# Patient Record
Sex: MALE | Race: WHITE | Employment: FULL TIME | URBAN - METROPOLITAN AREA
[De-identification: names, ages, dates, MRNs, and addresses within clinical notes are randomized per-mention and may not be internally consistent; named-entity substitution may affect disease eponyms.]

---

## 2017-08-30 DIAGNOSIS — Z13.71 TESTING OF MALE FOR GENETIC DISEASE CARRIER STATUS: ICD-10-CM

## 2017-08-30 DIAGNOSIS — Z13.71 TESTING OF MALE FOR GENETIC DISEASE CARRIER STATUS: Primary | ICD-10-CM

## 2017-08-30 PROCEDURE — 36415 COLL VENOUS BLD VENIPUNCTURE: CPT | Performed by: DERMATOLOGY

## 2017-08-30 PROCEDURE — 40000795 ZZHCL STATISTIC DNA PROCESS AND HOLD: Performed by: DERMATOLOGY

## 2017-09-15 LAB — COPATH REPORT: NORMAL

## 2017-10-16 ENCOUNTER — ALLIED HEALTH/NURSE VISIT (OUTPATIENT)
Dept: NURSING | Facility: CLINIC | Age: 32
End: 2017-10-16
Payer: COMMERCIAL

## 2017-10-16 DIAGNOSIS — Z23 INFLUENZA VACCINE NEEDED: Primary | ICD-10-CM

## 2017-10-16 PROCEDURE — G0008 ADMIN INFLUENZA VIRUS VAC: HCPCS | Mod: ZF

## 2017-10-16 PROCEDURE — 90686 IIV4 VACC NO PRSV 0.5 ML IM: CPT | Mod: ZF

## 2017-10-16 PROCEDURE — 25000128 H RX IP 250 OP 636: Mod: ZF

## 2017-10-16 NOTE — NURSING NOTE
"Injectable Influenza Immunization Documentation    1.  Has the patient received the information for the injectable influenza vaccine? YES     2. Is the patient 6 months of age or older? YES     3. Does the patient have any of the following contraindications?         Severe allergy to eggs? No     Severe allergic reaction to previous influenza vaccines? No   Severe allergy to latex? No       History of Guillain-Dryden syndrome? No     Currently have a temperature greater than 100.4F? No        4.  Severely egg allergic patients should have flu vaccine eligibility assessed by an MD, RN, or pharmacist, and those who received flu vaccine should be observed for 15 min by an MD, RN, Pharmacist, Medical Technician, or member of clinic staff.\": YES    5. Latex-allergic patients should be given latex-free influenza vaccine Yes. Please reference the Vaccine latex table to determine if your clinic s product is latex-containing.       Vaccination given by Lisa Davis comes into clinic today at the request of Influenza Vaccine Ordering Provider for [facility].      This service provided today was under the supervising provider of the yessi Murcia, who was available if needed.    Reason for visit: Med Injection only Influenza Vaccine    Lisa Villalobos            "

## 2017-10-16 NOTE — MR AVS SNAPSHOT
After Visit Summary   10/16/2017    Sumit Davis    MRN: [id number]           Patient Information     Date Of Birth          1985        Visit Information        Provider Department      10/16/2017 3:45 PM 2512, [facility]        Today's Diagnoses     Influenza vaccine needed    -  1       Follow-ups after your visit        Who to contact     Please call your clinic at [phone] to:    Ask questions about your health    Make or cancel appointments    Discuss your medicines    Learn about your test results    Speak to your doctor   If you have compliments or concerns about an experience at your clinic, or if you wish to file a complaint, please contact [facility] Patient Relations at [phone] or email us at [email or web address]         Additional Information About Your Visit        MyChart Information     Desmos is an electronic gateway that provides easy, online access to your medical records. With Desmos, you can request a clinic appointment, read your test results, renew a prescription or communicate with your care team.     To sign up for Aviaryt visit the website at [email or web address]   You will be asked to enter the access code listed below, as well as some personal information. Please follow the directions to create your username and password.     Your access code is: [email or web address]  Expires: 2018 11:10 AM     Your access code will  in 90 days. If you need help or a new code, please contact your [facility] Clinic or call [phone] for assistance.        Care EveryWhere ID     This is your Care EveryWhere ID. This could be used by other organizations to access your [place] medical records  [id number]         Blood Pressure from Last 3 Encounters:   05/15/11 104/60    Weight from Last 3 Encounters:   No data found for Wt              We Performed the Following     HC  FLU VAC PRESRV FREE QUAD SPLIT VIR 3+YRS IM        Primary Care Provider    None Specified       No primary provider on file.        Equal Access to Services     BARBARA BELL : Hadii aad ku hadrobert Weir, rosangelada billyrobby, tanvi taborda jorgesteven, waxfredi briones radhabryant patelchuck duanejasmin jake benedict. So [facility] [phone].    ATENCIÓN: Si habla español, tiene a jennings disposición servicios gratuitos de asistencia lingüística. Llame al [phone].    We comply with applicable federal civil rights laws and [place] laws. We do not discriminate on the basis of race, color, national origin, age, disability, sex, sexual orientation, or gender identity.            Thank you!     Thank you for choosing [facility]  for your care. Our goal is always to provide you with excellent care. Hearing back from our patients is one way we can continue to improve our services. Please take a few minutes to complete the written survey that you may receive in the mail after your visit with us. Thank you!             Your Updated Medication List - Protect others around you: Learn how to safely use, store and throw away your medicines at www.disposemymeds.org.          This list is accurate as of: 10/16/17 11:59 PM.  Always use your most recent med list.                   Brand Name Dispense Instructions for use Diagnosis    VYVANSE PO      Take  by mouth.

## 2017-10-24 DIAGNOSIS — Z84.81 FAMILY HISTORY OF CARRIER OF GENETIC DISEASE: Primary | ICD-10-CM

## 2017-10-25 NOTE — PROGRESS NOTES
Orders placed for brain MRI and renal ultrasound to assess for any findings associated with tuberous sclerosis (TSC) given new diagnosis of TSC in son.

## 2017-11-02 ENCOUNTER — HOSPITAL ENCOUNTER (OUTPATIENT)
Dept: MRI IMAGING | Facility: CLINIC | Age: 32
Discharge: HOME | End: 2017-11-02
Attending: MEDICAL GENETICS | Admitting: MEDICAL GENETICS
Payer: COMMERCIAL

## 2017-11-02 ENCOUNTER — HOSPITAL ENCOUNTER (OUTPATIENT)
Dept: ULTRASOUND IMAGING | Facility: CLINIC | Age: 32
End: 2017-11-02
Attending: MEDICAL GENETICS
Payer: COMMERCIAL

## 2017-11-02 DIAGNOSIS — Z84.81 FAMILY HISTORY OF CARRIER OF GENETIC DISEASE: ICD-10-CM

## 2017-11-02 PROCEDURE — 70551 MRI BRAIN STEM W/O DYE: CPT

## 2017-11-02 PROCEDURE — 76770 US EXAM ABDO BACK WALL COMP: CPT

## 2017-11-13 ENCOUNTER — TELEPHONE (OUTPATIENT)
Dept: CONSULT | Facility: CLINIC | Age: 32
End: 2017-11-13

## 2017-11-13 NOTE — TELEPHONE ENCOUNTER
Dr. Murcia called to update Sumit regarding his brain MRI and renal ultrasound results. We recommend follow-up with Dr. Gupta (neurologist who previously saw Sumit' son) for second opinion regarding brain findings. Please see results letter for additional information.

## 2017-11-13 NOTE — LETTER
November 13, 2017       TO: Sumit Davis  [address]  [place] [address]         Dear Sumit,    As was discussed over the phone, below is a summary of your brain MRI and renal ultrasound results. This was completed given your son's recent diagnosis of tuberous sclerosis (TSC).     Renal ultrasound: A single simple appearing renal cyst within the right kidney. While renal cysts are a common finding in patients with TSC, the finding of a single renal cyst is not uncommon in the general population, particularly with increasing age.    Brain MRI: Poorly defined nodular subcortical foci with increased T2 signal within the anterolateral right frontal lobe. It is unclear whether this represents a TSC-related finding, or if this is an incidental finding not related to TSC. We would recommend a consult with neurology to get their opinion regarding the clinical significance of this finding.     As you know, it is important to determine whether or not you have TSC as there are medical management and screening recommendations in the event a diagnosis of TSC were made. Furthermore, follow-up genetic testing recommendations for your son will vary depending on whether or not this condition was inherited.     Please do not hesitate to reach out with any questions or concerns.       Sincerely,      Kristine Marin MS, [facility]  Certified Genetic Counselor  [facility]  (p) [phone]  (f) [phone]

## 2017-12-13 ENCOUNTER — TELEPHONE (OUTPATIENT)
Dept: CONSULT | Facility: CLINIC | Age: 32
End: 2017-12-13

## 2017-12-13 DIAGNOSIS — R93.0 ABNORMAL MRI OF HEAD: Primary | ICD-10-CM

## 2017-12-13 DIAGNOSIS — Z84.89 FAMILY HISTORY OF GENETIC DISORDER: ICD-10-CM

## 2017-12-13 NOTE — PROGRESS NOTES
Sumit had a brain MRI completed here given his son's recent diagnosis of TSC. There was a poorly defined nodular subcortical foci with increased T2 signal within the anterolateral right frontal lobe. After obtaining patient consent, we forwarded images to Dr. Gupta, the neurologist who is following Sumit' son for TSC. Dr. Gupta reviewed images and was not able to tell whether or not this was a finding associated with TSC. Dr. Gupta recommended an abdominal MRI and dilated eye exam to assess for other findings which could be indicative of TSC. Dr. Murcia will place these orders. We will wait to complete skin biopsy for Sumit' son pending results of these further studies.    Kristine Marin MS, [facility]  Certified Genetic Counselor  [facility]  (p) [phone]  (f) [phone]

## 2017-12-21 ENCOUNTER — OFFICE VISIT (OUTPATIENT)
Dept: OPHTHALMOLOGY | Facility: CLINIC | Age: 32
End: 2017-12-21
Attending: OPHTHALMOLOGY
Payer: COMMERCIAL

## 2017-12-21 DIAGNOSIS — Z13.9 SCREENING FOR CONDITION: Primary | ICD-10-CM

## 2017-12-21 PROBLEM — Q85.1 TUBEROUS SCLEROSIS (H): Status: ACTIVE | Noted: 2017-12-21

## 2017-12-21 PROCEDURE — 92015 DETERMINE REFRACTIVE STATE: CPT | Mod: ZF

## 2017-12-21 PROCEDURE — 99213 OFFICE O/P EST LOW 20 MIN: CPT | Mod: ZF

## 2017-12-21 ASSESSMENT — TONOMETRY
OD_IOP_MMHG: 14
OS_IOP_MMHG: 16
IOP_METHOD: TONOPEN

## 2017-12-21 ASSESSMENT — CONF VISUAL FIELD
OS_NORMAL: 1
OD_NORMAL: 1

## 2017-12-21 ASSESSMENT — VISUAL ACUITY
OD_SC: 20/20
METHOD: SNELLEN - LINEAR
OS_SC: 20/20

## 2017-12-21 ASSESSMENT — REFRACTION_MANIFEST
OD_SPHERE: -0.25
OS_SPHERE: -0.50

## 2017-12-21 ASSESSMENT — SLIT LAMP EXAM - LIDS
COMMENTS: NORMAL
COMMENTS: NORMAL

## 2017-12-21 ASSESSMENT — EXTERNAL EXAM - LEFT EYE: OS_EXAM: NORMAL

## 2017-12-21 ASSESSMENT — EXTERNAL EXAM - RIGHT EYE: OD_EXAM: NORMAL

## 2017-12-21 NOTE — PROGRESS NOTES
Sumit Davis is a 32 year old male who presents today for a comprehensive exam. Patient has recently undergone testing for Tuberous Sclerosis because his son was suspected to have TS. Patient underwent MRI and was found to have a suspicious findings and was advised to obtain an eye exam. Patient denies any visual complaints.    Social History: Smoker  Fam History: Mother - choroidal melanoma  Past Ocular History: None  Ocular Medications: None    Assessment & Plan      Sumit Davis is a 32 year old male with the following diagnoses:   1. Screening for condition       Dilated funds exam normal  No retinal signs of Tuberous Sclerosis  Follow up in eye clinic as needed    Patient disposition:   Return if symptoms worsen or fail to improve.        Roberot Muñiz MD  PGY-2 Ophthalmology  [phone]    Attending Physician Attestation:  Complete documentation of historical and exam elements from today's encounter can be found in the full encounter summary report (not reduplicated in this progress note).  I personally obtained the chief complaint(s) and history of present illness.  I confirmed and edited as necessary the review of systems, past medical/surgical history, family history, social history, and examination findings as documented by others; and I examined the patient myself.  I personally reviewed the relevant tests, images, and reports as documented above.  I formulated and edited as necessary the assessment and plan and discussed the findings and management plan with the patient and family. . - Rodrigo Renteria MD

## 2017-12-21 NOTE — LETTER
12/21/2017       RE: Sumit Davis  [address]  [place] [address]     Dear Colleague,    Thank you for referring your patient, Sumit Davis, to the EYE CLINIC at [facility]. Please see a copy of my visit note below.    Sumit Davis is a 32 year old male who presents today for a comprehensive exam. Patient has recently undergone testing for Tuberous Sclerosis because his son was suspected to have TS. Patient underwent MRI and was found to have a suspicious findings and was advised to obtain an eye exam. Patient denies any visual complaints.    Social History: Smoker  Fam History: Mother - choroidal melanoma  Past Ocular History: None  Ocular Medications: None    Assessment & Plan      Sumit Davis is a 32 year old male with the following diagnoses:   1. Screening for condition       Dilated funds exam normal  No retinal signs of Tuberous Sclerosis  Follow up in eye clinic as needed    Patient disposition:   Return if symptoms worsen or fail to improve.        Roberto Muñiz MD  PGY-2 Ophthalmology  [phone]    Attending Physician Attestation:  Complete documentation of historical and exam elements from today's encounter can be found in the full encounter summary report (not reduplicated in this progress note).  I personally obtained the chief complaint(s) and history of present illness.  I confirmed and edited as necessary the review of systems, past medical/surgical history, family history, social history, and examination findings as documented by others; and I examined the patient myself.  I personally reviewed the relevant tests, images, and reports as documented above.  I formulated and edited as necessary the assessment and plan and discussed the findings and management plan with the patient and family. . - Rodrigo Renteria MD       Again, thank you for allowing me to participate in the care of your patient.      Sincerely,    Roberto Muñiz MD

## 2017-12-21 NOTE — MR AVS SNAPSHOT
After Visit Summary   12/21/2017    Sumit Davis    MRN: [id number]           Patient Information     Date Of Birth          1985        Visit Information        Provider Department      12/21/2017 1:45 PM Roberto Muñiz MD [facility]        Today's Diagnoses     Screening for condition    -  1       Follow-ups after your visit        Follow-up notes from your care team     Return if symptoms worsen or fail to improve.      Your next 10 appointments already scheduled     Dec 30, 2017  7:45 AM CST   (Arrive by 7:30 AM)   MR ABDOMEN W/O & W CONTRAST with [address] MRI ([facility])    [address] [address] [phone]           Take your medicines as usual, unless your doctor tells you not to. Bring a list of your current medicines to your exam (including vitamins, minerals and over-the-counter drugs). Also bring the results of similar scans you may have had.    The day before your exam, drink extra fluids at least six 8-ounce glasses (unless your doctor tells you to restrict your fluids).   Have a blood test (creatinine test) within 30 days of your exam. Go to your clinic or Diagnostic Imaging Department for this test.   Do not eat or drink for 6 hours prior to exam.  The MRI machine uses a strong magnet. Please wear clothes without metal (snaps, zippers). A sweatsuit works well, or we may give you a hospital gown.  Please remove any body piercings and hair extensions before you arrive. You will also remove watches, jewelry, hairpins, wallets, dentures, partial dental plates and hearing aids. You may wear contact lenses, and you may be able to wear your rings. We have a safe place to keep your personal items, but it is safer to leave them at home.   **IMPORTANT** THE INSTRUCTIONS BELOW ARE ONLY FOR THOSE PATIENTS WHO HAVE BEEN TOLD THEY WILL RECEIVE SEDATION OR GENERAL ANESTHESIA DURING THEIR MRI PROCEDURE:  IF  YOU WILL RECEIVE SEDATION (take medicine to help you relax during your exam):   You must get the medicine from your doctor before you arrive. Bring the medicine to the exam. Do not take it at home.   Arrive one hour early. Bring someone who can take you home after the test. Your medicine will make you sleepy. After the exam, you may not drive, take a bus or take a taxi by yourself.   No eating 8 hours before your exam. You may have clear liquids up until 4 hours before your exam. (Clear liquids include water, clear tea, black coffee and fruit juice without pulp.)  IF YOU WILL RECEIVE ANESTHESIA (be asleep for your exam):   Arrive 1 1/2 hours early. Bring someone who can take you home after the test. You may not drive, take a bus or take a taxi by yourself.   No eating 8 hours before your exam. You may have clear liquids up until 4 hours before your exam. (Clear liquids include water, clear tea, black coffee and fruit juice without pulp.)  If you have any questions, please contact your Imaging Department exam site.              Who to contact     Please call your clinic at [phone] to:    Ask questions about your health    Make or cancel appointments    Discuss your medicines    Learn about your test results    Speak to your doctor   If you have compliments or concerns about an experience at your clinic, or if you wish to file a complaint, please contact [facility] Patient Relations at [phone] or email us at [email or web address]         Additional Information About Your Visit        [employer] Information     Gema Touch is an electronic gateway that provides easy, online access to your medical records. With Gema Touch, you can request a clinic appointment, read your test results, renew a prescription or communicate with your care team.     To sign up for Gema Touch visit the website at [email or web address]   You will be asked to enter the access code listed below, as well as  some personal information. Please follow the directions to create your username and password.     Your access code is: [email or web address]  Expires: 2018 11:10 AM     Your access code will  in 90 days. If you need help or a new code, please contact your [facility] Clinic or call [phone] for assistance.        Care EveryWhere ID     This is your Care EveryWhere ID. This could be used by other organizations to access your [place] medical records  [id number]         Blood Pressure from Last 3 Encounters:   05/15/11 104/60    Weight from Last 3 Encounters:   No data found for Wt              Today, you had the following     No orders found for display       Primary Care Provider Fax #    Physician No Ref-Primary [phone]       No address on file        Equal Access to Services     BARBARA BELL : Hadii maggie usarezo Sodu, waaxda luqadaha, qaybta kaalmada adechuckyahank, radha ojseph . So [facility] [phone].    ATENCIÓN: Si habla español, tiene a jennings disposición servicios gratuitos de asistencia lingüística. Llame al [phone].    We comply with applicable federal civil rights laws and [place] laws. We do not discriminate on the basis of race, color, national origin, age, disability, sex, sexual orientation, or gender identity.            Thank you!     Thank you for choosing EYE CLINIC  for your care. Our goal is always to provide you with excellent care. Hearing back from our patients is one way we can continue to improve our services. Please take a few minutes to complete the written survey that you may receive in the mail after your visit with us. Thank you!             Your Updated Medication List - Protect others around you: Learn how to safely use, store and throw away your medicines at www.disposemymeds.org.          This list is accurate as of: 17 11:59 PM.  Always use your most recent med list.                   Brand Name Dispense  Instructions for use Diagnosis    VYVANSE PO      Take  by mouth.

## 2017-12-30 ENCOUNTER — RADIANT APPOINTMENT (OUTPATIENT)
Dept: MRI IMAGING | Facility: CLINIC | Age: 32
End: 2017-12-30
Attending: MEDICAL GENETICS
Payer: COMMERCIAL

## 2017-12-30 DIAGNOSIS — Z84.89 FAMILY HISTORY OF GENETIC DISORDER: ICD-10-CM

## 2017-12-30 DIAGNOSIS — R93.0 ABNORMAL MRI OF HEAD: ICD-10-CM

## 2017-12-30 RX ORDER — GADOBUTROL 604.72 MG/ML
7.5 INJECTION INTRAVENOUS ONCE
Status: COMPLETED | OUTPATIENT
Start: 2017-12-30 | End: 2017-12-30

## 2017-12-30 RX ADMIN — GADOBUTROL 7.5 ML: 604.72 INJECTION INTRAVENOUS at 08:23

## 2017-12-30 NOTE — DISCHARGE INSTRUCTIONS
MRI Contrast Discharge Instructions    The IV contrast you received today will pass out of your body in your  urine. This will happen in the next 24 hours. You will not feel this process.  Your urine will not change color.    Drink at least 4 extra glasses of water or juice today (unless your doctor  has restricted your fluids). This reduces the stress on your kidneys.  You may take your regular medicines.    If you are on dialysis: It is best to have dialysis today.    If you have a reaction: Most reactions happen right away. If you have  any new symptoms after leaving the hospital (such as hives or swelling),  call your hospital at the correct number below. Or call your family doctor.  If you have breathing distress or wheezing, call 911.    Special instructions: ***    I have read and understand the above information.    Signature:______________________________________ Date:___________    Staff:__________________________________________ Date:___________     Time:__________    [employer] Radiology Departments:    ___[place]: [phone]  ___[facility]: [phone]  ___[place]: [phone] ___[facility]: [phone]  ___[facility]: [phone]  ___[facility]: [phone]  ___[phone]  ___[facility]: [phone]  ___[phone]

## 2018-01-04 ENCOUNTER — TELEPHONE (OUTPATIENT)
Dept: CONSULT | Facility: CLINIC | Age: 33
End: 2018-01-04

## 2018-01-04 NOTE — TELEPHONE ENCOUNTER
Placed TC and spoke to Sumit. Abdominal MRI was normal with no evidence of TSC, aside from the single renal cyst which was previously detected on renal ultrasound. Given this normal imaging study, in addition to a normal eye exam, we are recommending a skin biopsy for his son to complete genetic testing on an affected tissue (hypopigmented macule). We do not recommend further studies for Sumit at this time. Sumit was in agreement with this plan.